# Patient Record
Sex: MALE | Race: OTHER | NOT HISPANIC OR LATINO | URBAN - METROPOLITAN AREA
[De-identification: names, ages, dates, MRNs, and addresses within clinical notes are randomized per-mention and may not be internally consistent; named-entity substitution may affect disease eponyms.]

---

## 2020-05-12 ENCOUNTER — EMERGENCY (EMERGENCY)
Facility: HOSPITAL | Age: 40
LOS: 1 days | Discharge: HOME | End: 2020-05-12
Attending: EMERGENCY MEDICINE | Admitting: EMERGENCY MEDICINE
Payer: COMMERCIAL

## 2020-05-12 VITALS
SYSTOLIC BLOOD PRESSURE: 109 MMHG | HEART RATE: 71 BPM | OXYGEN SATURATION: 98 % | RESPIRATION RATE: 18 BRPM | DIASTOLIC BLOOD PRESSURE: 74 MMHG | TEMPERATURE: 98 F

## 2020-05-12 VITALS
WEIGHT: 190.04 LBS | RESPIRATION RATE: 18 BRPM | TEMPERATURE: 99 F | HEIGHT: 65 IN | HEART RATE: 100 BPM | SYSTOLIC BLOOD PRESSURE: 137 MMHG | DIASTOLIC BLOOD PRESSURE: 99 MMHG | OXYGEN SATURATION: 98 %

## 2020-05-12 LAB
ALBUMIN SERPL ELPH-MCNC: 4.6 G/DL — SIGNIFICANT CHANGE UP (ref 3.3–5)
ALP SERPL-CCNC: 75 U/L — SIGNIFICANT CHANGE UP (ref 40–120)
ALT FLD-CCNC: 41 U/L — SIGNIFICANT CHANGE UP (ref 10–45)
ANION GAP SERPL CALC-SCNC: 15 MMOL/L — SIGNIFICANT CHANGE UP (ref 5–17)
AST SERPL-CCNC: 26 U/L — SIGNIFICANT CHANGE UP (ref 10–40)
BASOPHILS # BLD AUTO: 0.06 K/UL — SIGNIFICANT CHANGE UP (ref 0–0.2)
BASOPHILS NFR BLD AUTO: 0.8 % — SIGNIFICANT CHANGE UP (ref 0–2)
BILIRUB SERPL-MCNC: 0.5 MG/DL — SIGNIFICANT CHANGE UP (ref 0.2–1.2)
BUN SERPL-MCNC: 5 MG/DL — LOW (ref 7–23)
CALCIUM SERPL-MCNC: 9.8 MG/DL — SIGNIFICANT CHANGE UP (ref 8.4–10.5)
CHLORIDE SERPL-SCNC: 101 MMOL/L — SIGNIFICANT CHANGE UP (ref 96–108)
CO2 SERPL-SCNC: 25 MMOL/L — SIGNIFICANT CHANGE UP (ref 22–31)
CREAT SERPL-MCNC: 0.83 MG/DL — SIGNIFICANT CHANGE UP (ref 0.5–1.3)
EOSINOPHIL # BLD AUTO: 0.47 K/UL — SIGNIFICANT CHANGE UP (ref 0–0.5)
EOSINOPHIL NFR BLD AUTO: 6 % — SIGNIFICANT CHANGE UP (ref 0–6)
GLUCOSE SERPL-MCNC: 89 MG/DL — SIGNIFICANT CHANGE UP (ref 70–99)
HCT VFR BLD CALC: 48.5 % — SIGNIFICANT CHANGE UP (ref 39–50)
HGB BLD-MCNC: 16.6 G/DL — SIGNIFICANT CHANGE UP (ref 13–17)
IMM GRANULOCYTES NFR BLD AUTO: 0.4 % — SIGNIFICANT CHANGE UP (ref 0–1.5)
LIDOCAIN IGE QN: 17 U/L — SIGNIFICANT CHANGE UP (ref 7–60)
LYMPHOCYTES # BLD AUTO: 2.21 K/UL — SIGNIFICANT CHANGE UP (ref 1–3.3)
LYMPHOCYTES # BLD AUTO: 28.1 % — SIGNIFICANT CHANGE UP (ref 13–44)
MCHC RBC-ENTMCNC: 28.2 PG — SIGNIFICANT CHANGE UP (ref 27–34)
MCHC RBC-ENTMCNC: 34.2 GM/DL — SIGNIFICANT CHANGE UP (ref 32–36)
MCV RBC AUTO: 82.5 FL — SIGNIFICANT CHANGE UP (ref 80–100)
MONOCYTES # BLD AUTO: 0.47 K/UL — SIGNIFICANT CHANGE UP (ref 0–0.9)
MONOCYTES NFR BLD AUTO: 6 % — SIGNIFICANT CHANGE UP (ref 2–14)
NEUTROPHILS # BLD AUTO: 4.63 K/UL — SIGNIFICANT CHANGE UP (ref 1.8–7.4)
NEUTROPHILS NFR BLD AUTO: 58.7 % — SIGNIFICANT CHANGE UP (ref 43–77)
NRBC # BLD: 0 /100 WBCS — SIGNIFICANT CHANGE UP (ref 0–0)
PLATELET # BLD AUTO: 283 K/UL — SIGNIFICANT CHANGE UP (ref 150–400)
POTASSIUM SERPL-MCNC: 3.9 MMOL/L — SIGNIFICANT CHANGE UP (ref 3.5–5.3)
POTASSIUM SERPL-SCNC: 3.9 MMOL/L — SIGNIFICANT CHANGE UP (ref 3.5–5.3)
PROT SERPL-MCNC: 7.6 G/DL — SIGNIFICANT CHANGE UP (ref 6–8.3)
RBC # BLD: 5.88 M/UL — HIGH (ref 4.2–5.8)
RBC # FLD: 11.7 % — SIGNIFICANT CHANGE UP (ref 10.3–14.5)
SODIUM SERPL-SCNC: 141 MMOL/L — SIGNIFICANT CHANGE UP (ref 135–145)
WBC # BLD: 7.87 K/UL — SIGNIFICANT CHANGE UP (ref 3.8–10.5)
WBC # FLD AUTO: 7.87 K/UL — SIGNIFICANT CHANGE UP (ref 3.8–10.5)

## 2020-05-12 PROCEDURE — 99285 EMERGENCY DEPT VISIT HI MDM: CPT

## 2020-05-12 PROCEDURE — 36415 COLL VENOUS BLD VENIPUNCTURE: CPT

## 2020-05-12 PROCEDURE — 96374 THER/PROPH/DIAG INJ IV PUSH: CPT

## 2020-05-12 PROCEDURE — 80053 COMPREHEN METABOLIC PANEL: CPT

## 2020-05-12 PROCEDURE — 71045 X-RAY EXAM CHEST 1 VIEW: CPT

## 2020-05-12 PROCEDURE — 85025 COMPLETE CBC W/AUTO DIFF WBC: CPT

## 2020-05-12 PROCEDURE — 71045 X-RAY EXAM CHEST 1 VIEW: CPT | Mod: 26

## 2020-05-12 PROCEDURE — 82550 ASSAY OF CK (CPK): CPT

## 2020-05-12 PROCEDURE — 82553 CREATINE MB FRACTION: CPT

## 2020-05-12 PROCEDURE — 96361 HYDRATE IV INFUSION ADD-ON: CPT

## 2020-05-12 PROCEDURE — 99284 EMERGENCY DEPT VISIT MOD MDM: CPT | Mod: 25

## 2020-05-12 PROCEDURE — 84484 ASSAY OF TROPONIN QUANT: CPT

## 2020-05-12 PROCEDURE — 83690 ASSAY OF LIPASE: CPT

## 2020-05-12 RX ORDER — LIDOCAINE 4 G/100G
15 CREAM TOPICAL ONCE
Refills: 0 | Status: COMPLETED | OUTPATIENT
Start: 2020-05-12 | End: 2020-05-12

## 2020-05-12 RX ORDER — SODIUM CHLORIDE 9 MG/ML
1000 INJECTION INTRAMUSCULAR; INTRAVENOUS; SUBCUTANEOUS ONCE
Refills: 0 | Status: COMPLETED | OUTPATIENT
Start: 2020-05-12 | End: 2020-05-12

## 2020-05-12 RX ORDER — FAMOTIDINE 10 MG/ML
20 INJECTION INTRAVENOUS ONCE
Refills: 0 | Status: COMPLETED | OUTPATIENT
Start: 2020-05-12 | End: 2020-05-12

## 2020-05-12 RX ADMIN — LIDOCAINE 15 MILLILITER(S): 4 CREAM TOPICAL at 17:48

## 2020-05-12 RX ADMIN — FAMOTIDINE 20 MILLIGRAM(S): 10 INJECTION INTRAVENOUS at 17:47

## 2020-05-12 RX ADMIN — SODIUM CHLORIDE 1000 MILLILITER(S): 9 INJECTION INTRAMUSCULAR; INTRAVENOUS; SUBCUTANEOUS at 18:22

## 2020-05-12 RX ADMIN — SODIUM CHLORIDE 1000 MILLILITER(S): 9 INJECTION INTRAMUSCULAR; INTRAVENOUS; SUBCUTANEOUS at 17:48

## 2020-05-12 RX ADMIN — Medication 30 MILLILITER(S): at 17:47

## 2020-05-12 NOTE — ED PROVIDER NOTE - ATTENDING CONTRIBUTION TO CARE
40 m w intermittent mild epigastric pain- ate heavy food, chinese food and chili oil for birthday- developed epigsatric pain  no n/v/diarrhea fh of heart disease  vss  s1s2 lungs cta bl  abd soft nt nd +bs  ext no c/c/e  IMP- Epigastric pain- improved w gi cocktail  ekg nonischemic  labs, including troponin

## 2020-05-12 NOTE — ED PROVIDER NOTE - NSFOLLOWUPINSTRUCTIONS_ED_ALL_ED_FT
Take pepcid 20mg twice daily for acid reduction. You may also take maalox/pepto bismol prior to eating to coat your stomach and relieve indigestion.  Attempt to avoid spicy or acidic foods to see if this improves your symptoms.  Follow up as scheduled with your primary care doctor.    Gastritis, Adult  Gastritis is inflammation of the stomach.     There are two kinds of gastritis:  Acute gastritis. This kind develops suddenly.   Chronic gastritis. This kind is much more common and lasts for a long time.    Gastritis happens when the lining of the stomach becomes weak or gets damaged. Without treatment, gastritis can lead to stomach bleeding and ulcers.    What are the causes?  This condition may be caused by:  An infection. Drinking too much alcohol. Certain medicines. These include steroids, antibiotics, and some over-the-counter medicines, such as aspirin or ibuprofen. Having too much acid in the stomach. A disease of the intestines or stomach. Stress. An allergic reaction. Crohn's disease. Some cancer treatments (radiation). Sometimes the cause of this condition is not known.    What are the signs or symptoms?  Symptoms of this condition include:  Pain or a burning sensation in the upper abdomen. Nausea. Vomiting. An uncomfortable feeling of fullness after eating. Weight loss. Bad breath. Blood in your vomit or stools. In some cases, there are no symptoms.    How is this diagnosed?  This condition may be diagnosed with:  Your medical history and a description of your symptoms. A physical exam. Tests.   These can include:  Blood tests. Stool tests. A test in which a thin, flexible instrument with a light and a camera is passed down the esophagus and into the stomach (upper endoscopy).A test in which a sample of tissue is taken for testing (biopsy).    How is this treated?  This condition may be treated with medicines. The medicines that are used vary depending on the cause of the gastritis:  If the condition is caused by a bacterial infection, you may be given antibiotic medicines. If the condition is caused by too much acid in the stomach, you may be given medicines called H2 blockers, proton pump inhibitors, or antacids. Treatment may also involve stopping the use of certain medicines, such as aspirin, ibuprofen, or other NSAIDs.    Follow these instructions at home:  Medicines     Take over-the-counter and prescription medicines only as told by your health care provider. If you were prescribed an antibiotic medicine, take it as told by your health care provider. Do not stop taking the antibiotic even if you start to feel better.    Eating and drinking     Eat small, frequent meals instead of large meals. Avoid foods and drinks that make your symptoms worse. Drink enough fluid to keep your urine pale yellow.    Alcohol use   Do not drink alcohol if:  Your health care provider tells you not to drink. You are pregnant, may be pregnant, or are planning to become pregnant.     If you drink alcohol:  Limit your use to:  0–1 drink a day for women.0–2 drinks a day for men. Be aware of how much alcohol is in your drink. In the [place] one drink equals one 12 oz bottle of beer (355 mL), one 5 oz glass of wine (148 mL), or one 1½ oz glass of hard liquor (44 mL).General instructions     Talk with your health care provider about ways to manage stress, such as getting regular exercise or practicing deep breathing, meditation, or yoga. Do not use any products that contain nicotine or tobacco, such as cigarettes and e-cigarettes. If you need help quitting, ask your health care provider. Keep all follow-up visits as told by your health care provider. This is important.    Contact a health care provider if:  Your symptoms get worse. Your symptoms return after treatment.    Get help right away if:  You vomit blood or material that looks like coffee grounds. You have black or dark red stools. You are unable to keep fluids down. Your abdominal pain gets worse. You have a fever. You do not feel better after one week.    Summary  Gastritis is inflammation of the lining of the stomach that can occur suddenly (acute) or develop slowly over time (chronic).This condition is diagnosed with a medical history, a physical exam, or tests. This condition may be treated with medicines to treat infection or medicines to reduce the amount of acid in your stomach. Follow your health care provider's instructions about taking medicines, making changes to your diet, and knowing when to call for help. This information is not intended to replace advice given to you by your health care provider. Make sure you discuss any questions you have with your health care provider.

## 2020-05-12 NOTE — ED PROVIDER NOTE - CLINICAL SUMMARY MEDICAL DECISION MAKING FREE TEXT BOX
ED course unremarkable - afebrile and hemodynamically stable. His abdomen is soft and non-tender. Pain improved after 1L NS, pepcid 20mg IV, maalox/viscous lidocaine po. Denies chest pain or shortness of breath. EKG without evidence of acute ischemic changes, troponin and CKMB negative. HEART score 1 for risk factors - low suspicion for ACE and low risk of MACE. CBC, CMP, lipase unremarkable. CXR without acute cardiopulmonary abnormality. Suspect gastritis/GERD in the setting of recent dietary adjustments. Discussed results with pt and expected course of management. Will trial pepcid and maalox, lifestyle modifications. Has follow up with PMD tomorrow. Strict return precautions.

## 2020-05-12 NOTE — ED PROVIDER NOTE - OBJECTIVE STATEMENT
39yo male with pmhx of high triglycerides presents with 3 days of mild, intermittent epigastric pain. Pt reports he has been eating "clean" during quarantine but ate Chinese food with chili oil prior to onset of pain. He denies fever, chills, cough, shortness of breath, exertional pain, nausea/vomiting, diarrhea, constipation, blood in stool. He has taken tums as well as an ayurvedic stomach-soothing medicine without relief. He has multiple family members with CAD/MI so was worried. He is a current smoker, recently cut down from 15 to 5 cigarettes/day. Of note, he reports normal stress test last year. He has PMD follow up tomorrow.

## 2020-05-12 NOTE — ED ADULT NURSE NOTE - CHPI ED NUR SYMPTOMS NEG
no nausea/no syncope/no shortness of breath/no vomiting/no congestion/no fever/no dizziness/no diaphoresis/no back pain/no chills

## 2020-05-12 NOTE — ED PROVIDER NOTE - NS ED ROS FT
Constitutional: No fever. No chills.  Eyes: No redness. No discharge. No vision change.   ENT: No sore throat. No ear pain.  Cardiovascular: No chest pain. No leg swelling.  Respiratory: No cough. No shortness of breath.  GI: +abdominal pain. No vomiting. No diarrhea.   MSK: No joint pain. No back pain.   Skin: No rash. No abrasions.   Neuro: No numbness. No weakness.   Psych: No known mental health issues.

## 2020-05-12 NOTE — ED ADULT NURSE NOTE - OBJECTIVE STATEMENT
41 yo male c/o substernal chest pain that occurs only while sitting x 2 days. Denies nausea/vomiting/diarrhea. Denies SOB, fever, cough. Reports that he is seeing his PCP tomorrow, 5/13, but wanted to come to the ER today because he is concerned about his family history of MI.

## 2020-05-12 NOTE — ED PROVIDER NOTE - PATIENT PORTAL LINK FT
You can access the FollowMyHealth Patient Portal offered by [facility] by registering at the following website: [email or web address]. By joining [facility]’s FollowMyHealth portal, you will also be able to view your health information using other applications (apps) compatible with our system.

## 2020-05-15 DIAGNOSIS — R10.13 EPIGASTRIC PAIN: ICD-10-CM

## 2024-11-29 NOTE — ED ADULT NURSE NOTE - NSHOSCREENINGQ1_ED_ALL_ED
CARDIOVASCULAR MEDICINE       PERTINENT PROBLEM LIST         CAD   DM s/p R BKA   HENRI on CPAP  Hyperlipidemia  Left lower lobe metastatic malignant melanoma             PLAN     We discussed the significance of his coronary atherosclerosis, LDL remains optimal, he is not currently having any symptoms.    Currently completing immunotherapy for invasive melanoma to the lungs    No changes to medications.    Follow-up in our office in September of next year      ASSESSMENT        I had the pleasure of evaluating Fernando today, please contact our office with any questions or concerns      1. Essential hypertension          HISTORY OF PRESENT ILLNESS            Fernando is a pleasant 69 year old year old male presenting for transfer of care. Patient denies chest pain, pressure, tightness, and heaviness. Denies exertional dyspnea, orthopnea, and PND. He has taken his medications this morning as prescribed. He otherwise denies any palpitations, angina, dyspnea, dizziness, or lightheadedness at this time. Patient has no further cardiovascular concerns at this time.         LDL 62 - 10/2024    Ejection Fraction   Date Value Ref Range Status   08/13/2024 60 % Final       HGB (g/dL)   Date Value   11/15/2024 12.3 (L)        DATA REVIEW     New dx: Malignant melanoma metastatic to lung    LOGAN from Dr. Russell  CAD elevated Ca score 303  DM s/p R BKA   HENRI on CPAP    LAST OV WAS ON 7/30/24:    Preoperative clearance  Given his abnormal calcium score risk factors diabetes peripheral vascular disease family history smoking and difficult to ascertain functional status I believe would be prudent to have him undergo a regadenoson Cardiolite stress test before proceeding with his pulmonary procedure  2. Agatston coronary artery calcium score between 100 and 400  Patient clearly has coronary artery disease [place] no indication to proceed to cardiac catheterization at this point will await his perfusion scan he needs aspirin statin and  beta-blocker at this point which we will continue  Coronary artery disease involving native coronary artery of native heart without angina pectoris   Bruit  Soft carotid bruit will obtain carotid artery duplex scan        INTERVAL PROGRESS SINCE LAST VISIT:  Echo, carotid duplex, stress test    Oncology [facility] 11/15/24 note: cycle 3  patient had bronchoscopy 8/30/2024  with endobronchial ultrasound.  Left lower lobe lung tissue was positive for metastatic malignant melanoma. Started Ipi/Nivo First dose 10/4/2024. Will be returning to [place] after cycle 4 early Dec        DATA REVIEWED:    8/28/24 Stress test Regadenoson w myocardial perfusion  EF 61%  1. Normal myocardial perfusion study with no ischemia.  2. Normal left ventricular systolic function      8/20/24 Carotid Duplex  Right and left carotid artery atherosclerotic disease, less than 50% stenosis    8/13/24 Echo  Normal left ventricular size and systolic function, EF 60 %, GLS -17.2 %.  Normal left ventricular diastolic filling pressure.   Mildly dilated right ventricle with overall normal systolic function.  No significant valve abnormalities.  No pericardial effusion.    7/22/24 PFT- normal.   7/13/24 PET scan  Scattered atherosclerotic calcification including coronary arterial  calcification. Mild dilation of the main pulmonary artery again  demonstrated.  Hypermetabolic left lower lobe lung mass and right lower lobe pulmonary  nodule highly suspicious for malignancy. Could be due to metastatic disease  versus primary lung malignancy.    5/30/24 CT heart calcium scoring     FINDINGS:     Agatston Coronary Calcium Score  LMA: 9  LAD: 74  LCX: 160  RCA: 60  Total: 303  Percentile: 70%     Great Vessels  Ascending Aorta: 37 mm  Descending Aorta: 28 mm  Main Pulmonary Artery: 35 mm     Extra-coronary Calcification: Mild calcification in the aorta and aortic  valve. Aortic valve calcium score is 15.  Heart/Pericardium: Normal.     Other Findings: 6.3 x 3.6 x 7.6 cm  mass in the posterior left lower lobe  involving the superior and the posterior basal segments on series 7 image  24. 15 mm right lower lobe nodule on series 7 image 28. Mildly enlarged  right paratracheal node measuring 12 mm.  : No additional finding.        IMPRESSION:  *  Suspicious 7.6 cm left lower lobe mass. Indeterminate 15 mm right lower  lobe pulmonary nodule. Mildly enlarged right paratracheal node. Recommend  further work-up with dedicated CT chest with contrast.  *  Total coronary artery calcium score is 303.    5/5/2021 Stress test  Olinda Preserved LVEF 78%, no ischemia    3/12/21 Echo  LVEF 55%  Normal LV systolic function. Grade 1 diastolic dysfunction.  Mild mitral regurgitation.          3/6/21 Calcium score  FINDINGS:  Coronary calcium score:      AJ-130:        LMA:  0            LAD:  51.1          LCX:  87.0  RCA : 8.57            Total:  147       Sleep study in July 2020 with AHI of 41  On AutoPap with a range of 8 to 15 cm H2O      WEIGHT TREND     Wt Readings from Last 10 Encounters:   11/29/24 128.8 kg (284 lb)   11/15/24 127 kg (279 lb 14.4 oz)   10/28/24 132.8 kg (292 lb 12.8 oz)   10/25/24 129 kg (284 lb 4.8 oz)   10/11/24 129.5 kg (285 lb 9.6 oz)   10/04/24 128.3 kg (282 lb 14.4 oz)   10/02/24 128.5 kg (283 lb 4.8 oz)   09/13/24 130.8 kg (288 lb 6.4 oz)   08/30/24 129.3 kg (285 lb 0.9 oz)   08/16/24 131.5 kg (290 lb)       LABS       No results for input(s): \"NTPROB\", \"DIG\", \"BNP\" in the last 8765 hours.    Recent Labs   Lab 11/15/24  0856 10/25/24  0951 10/04/24  0936 09/13/24  1316 07/02/24  1458   Creatinine 0.75 0.89 0.80 0.99 0.91   Glomerular Filtration Rate >90 >90 >90 83 >90   Potassium 4.6 4.5 4.2 4.8 4.3   Sodium 136 139 139 141 140   BUN 14 13 15 16 13       Recent Labs   Lab 11/15/24  0856 10/25/24  0951 10/04/24  0936 09/13/24  1316 05/30/24  1720   HGB 12.3* 13.4 13.7 13.9 13.9    341 296 273 243       Recent Labs   Lab 11/15/24  1022 11/15/24  0856  10/25/24  0951 10/04/24  0936 09/13/24  1316   GOT/AST  --  26 18 21 16   GPT  --  21 28 24 24   Albumin  --  2.8* 3.1* 3.1* 3.4*   Bilirubin, Total  --  0.5 0.4 0.3 0.4   Alkaline Phosphatase  --  106 118* 116 121*   Protein, Total 7.2 8.1 8.0 7.6 7.2        No results for input(s): \"CHOLESTEROL\", \"TRIGLYCERIDE\", \"HDL\", \"LDLDIR\" in the last 8765 hours.     No results for input(s): \"INR\" in the last 8765 hours.     Recent Labs   Lab 11/15/24  0856 09/13/24  1316   TSH 2.733 2.985        Recent Labs   Lab 11/15/24  0856 10/25/24  0951 10/04/24  0936 09/13/24  1316 07/02/24  1458   Calcium 9.3 9.5 9.2 9.3 9.2          PHYSICAL EXAM         Visit Vitals  /72 (BP Location: LUE - Left upper extremity, Patient Position: Sitting, Cuff Size: Regular)   Pulse 91   Wt 128.8 kg (284 lb)   SpO2 98%   BMI 35.69 kg/m²       The ASCVD Risk score (Bria DK, et al., 2019) failed to calculate for the following reasons:    The valid total cholesterol range is 130 to 320 mg/dL     Physical Exam  Vitals and nursing note reviewed.   Constitutional:       Appearance: Normal appearance.   Cardiovascular:      Rate and Rhythm: Normal rate and regular rhythm.   Pulmonary:      Effort: Pulmonary effort is normal.   Musculoskeletal:         General: No swelling.   Neurological:      General: No focal deficit present.      Mental Status: He is alert.   Psychiatric:         Mood and Affect: Mood normal.         Behavior: Behavior normal.                The following histories were reviewed:      Patient Active Problem List    Diagnosis Date Noted    Essential hypertension 11/14/2019     Priority: Medium    Encounter for immunotherapy 10/02/2024     Priority: Low    Encounter for education 10/02/2024     Priority: Low    Malignant melanoma metastatic to lung  (CMD) 09/13/2024     Priority: Low    History of below-knee amputation of right lower extremity  (CMD) 04/27/2022     Priority: Low    Obstructive sleep apnea 11/14/2019     Priority:  Low    Hyperlipidemia LDL goal <70 11/14/2019     Priority: Low    Diabetic peripheral neuropathy associated with type 2 diabetes mellitus  (CMD) 11/14/2019     Priority: Low    Sensorineural hearing loss (SNHL) of both ears 11/14/2019     Priority: Low    Type 2 diabetes mellitus with foot ulcer, with long-term current use of insulin (CMD) 11/14/2019     Priority: Low    Type 2 diabetes mellitus with both eyes affected by mild nonproliferative retinopathy without macular edema, with long-term current use of insulin (CMD) 03/02/2018     Priority: Low      Current Outpatient Medications   Medication Sig Dispense Refill    guaiFENesin-codeine (GUAIFENESIN AC) 100-10 MG/5ML syrup Take 5 mLs by mouth 3 times daily as needed for Cough. 118 mL 0    prochlorperazine (COMPAZINE) 10 MG tablet Take 1 tablet by mouth every 6 hours as needed for Nausea or Vomiting. 30 tablet 5    Ozempic, 1 MG/DOSE, 4 MG/3ML Solution Pen-injector       metFORMIN (GLUCOPHAGE-XR) 500 MG 24 hr tablet TAKE 2 TABLETS TWICE A  tablet 0    Multiple Minerals (CALCIUM/MAGNESIUM/ZINC PO) Take 1 tablet by mouth daily.      valsartan (DIOVAN) 40 MG tablet Take 1 tablet by mouth daily. 90 tablet 3    simvastatin (ZOCOR) 40 MG tablet Take 1 tablet by mouth nightly. 90 tablet 3    Continuous Blood Gluc  (FREESTYLE KAY READER) Device Use to test Bg 4 times daily. Dx type 2 DM on insulin pump.      insulin lispro (HUMALOG) 100 UNIT/ML injectable solution [The details of the medication are not available because there are pending changes by a home health clinician.]      Ostomy Supplies (SKIN PREP WIPES) Misc Use for pump aplication.      turmeric 500 MG capsule Take 500 mg by mouth daily.      aspirin (ECOTRIN) 81 MG EC tablet Take 81 mg by mouth.      clobetasol (TEMOVATE) 0.05 % cream Apply topically 2 times daily as needed.      Trulicity 3 MG/0.5ML Solution Pen-injector Inject 3 mg into the skin every 7 days. Indications: prescribed by PCP  in [place]      Insulin Infusion Pump (MINIMED 630G INSULIN PUMP) Kit Inject 1 application into the skin every 3 days. 1 kit 0    insulin subQ pump Inject into the skin continuous. Continuous Infusion Pump with Lispro 100 units/mL.    Basal rate: From midnight to 4:30 am: 2.55 units/hr. From 4:30 am to 7:30 am: 2.75 units/hr. From 7:30 am to midnight: 1.6 units/hr.    With meals, enter blood sugar for bolus dosing. Maximum daily dose of 100 units.    Bolus Max: From midnight to noon, 5 units. From noon to 5:30 pm, 6 units. From 5:30 pm to midnight, 5 units.    Target goal: midnight to 5:30 AM: 100-130, from 5:30 AM to midnight: 100-120.    Correction factor: 20    Active insulin: 3 hours    Pump brand: Cynny 630G Minimed 300mL pump      Multiple Vitamins-Minerals (MULTIVITAMIN ADULT PO) Take 1 tablet by mouth daily.        No current facility-administered medications for this visit.     ALLERGIES:   Allergen Reactions    Augmentin [Amoclan] DIZZINESS    Gentamicin Sulfate RASH     03/15/2018: See wound clinic note from this date. He was prescribed Gentamicin ointment for his right ankle ulcer. He noted that the periwound became erythematous and slightly eroded after use. He discontinued the ointment in favor of a different product. This may have been a moisture associated problem or a contact dermatitis.    Lisinopril Cough    Vardenafil HEADACHES     Past Medical History:   Diagnosis Date    Arthritis     Background diabetic retinopathy associated with type 2 diabetes mellitus  (CMD) 11/14/2019    Cardiomegaly     Charcot foot due to diabetes mellitus  (CMD)     Diabetes mellitus  (CMD)     age 43    Diabetic retinopathy  (CMD)     Diverticula of colon     Essential hypertension 11/14/2019    Hallux limitus of left foot     High cholesterol     Insulin pump in place 11/16/2017    Malignant neoplasm  (CMD)     malignant melinoma of neck, right posterior    Meningitis spinal (CMD) 1957    Obesity     Peripheral  neuropathy     Psoriasis     Shingles     Sleep apnea     Status post below-knee amputation of right lower extremity  (CMD) 5/27/2020      Past Surgical History:   Procedure Laterality Date    Amputation low leg,circular Right 04/24/2018    BKA    Bronchoscopy transendo for peripheral lesions  8/30/2024    Bronchoscopy transtrach transbronch bx asp 1 or 2 mediastinal lymph  8/30/2024    Bronchoscopy w computer assisted image  8/30/2024    Bronchoscopy,transbronch biopsy  8/30/2024    Colonoscopy  2012    Ct scan for needle biopsy  8/30/2024    External fixator application Left 06/05/2019    left foot    Foot surgery Right 12/08/2017; 10/17/2017    I and D    Hardware removal Left 10/05/2023    great toe    Incision and drainage Right 12/05/2017    Joint replacement Right     knee    Knee scope,diagnostic  2012    Nasal fracture surgery  12/1974    Rotator cuff repair Right 05/2008    Skin cancer excision Right 2016    neck    Toe amputation Right 09/28/2017    excision of partial first metatarsal head; bone biopsy and excisional debridement right foot ulceration to the level of necrotic tendon and muscle    Vasectomy      Wrist tendon transfer Right 2016     Family History   Problem Relation Age of Onset    Diabetes Father     Hyperlipidemia Father     Coronary Artery Disease Father     Cancer, Rectal Sister     Amblyopia Brother     Diabetes Brother     Alcohol Abuse Brother     Myocardial Infarction Brother     Patient is unaware of any medical problems Brother     Substance Abuse Brother     Suicide Brother     Diabetes Brother     Cataracts Maternal Grandmother     Glaucoma Maternal Grandmother     Diabetes Paternal Grandmother     Patient is unaware of any medical problems Daughter     Blindness Neg Hx     Macular degeneration Neg Hx     Retinal detachment Neg Hx     Sjogren's Syndrome Neg Hx     Strabismus Neg Hx       Social History     Tobacco Use    Smoking status: Former     Current packs/day: 0.00      Average packs/day: 0.5 packs/day for 12.0 years (6.0 ttl pk-yrs)     Types: Cigarettes     Start date: 1976     Quit date:      Years since quittin.9    Smokeless tobacco: Never   Substance Use Topics    Alcohol use: No       On 2024, [place] POE scribed the services personally performed by Wolfgang Huerta DO    The documentation recorded by the scribe accurately and completely reflects the service(s) I personally performed and the decisions made by me.         I spent a total of 30 minutes on the day of the visit.   This included preparing to see the patient (review of labs, diagnostic testings and notes), obtaining a history, performing a medically appropriate examination, counseling and educating the patient regarding diagnosis and plan of care, ordering medications, tests or procedures, communicating the care plan with other healthcare professionals and documenting information in the electronic health record.       WOLFGANG HUERTA DO, [facility]  CARDIOVASCULAR MEDICINE     24  2:13 AM       Per federal law for medical records transparency, this note can be viewed by the patient. However, this note is structured and documented as a communication amongst medical professionals/clinicians. Verbiage and commentary are not performed for communication directly with a nonmedical person including, generally, the patient.    Additionally, generally, at least a portion of this note was generated using dictation software, this may result in alternative word use, sentence fragments, dropped words, or misinterpreted phrases, syntax, or words. Best effort has been made to correct such errors. However, if there is confusion related to these issues, do not hesitate to contact our office.        No

## 2025-03-20 NOTE — ED PROVIDER NOTE - RATE
Patient has lab appointment 3-  
Patient's home/cell number message on machine to call back.  Provider asking us to contact patient to make a fasting lab only appointment. Orders are in Epic. Patient had a physical on 3- and it looks like she never made it to lab.  Patient had requested her lithium level be faxed to Rebecca Brown NP at [facility] phone: [phone] fax:[phone].    
88